# Patient Record
Sex: FEMALE | Race: WHITE | URBAN - METROPOLITAN AREA
[De-identification: names, ages, dates, MRNs, and addresses within clinical notes are randomized per-mention and may not be internally consistent; named-entity substitution may affect disease eponyms.]

---

## 2022-04-10 ENCOUNTER — HISTORICAL (OUTPATIENT)
Dept: ADMINISTRATIVE | Facility: HOSPITAL | Age: 57
End: 2022-04-10

## 2022-04-11 ENCOUNTER — HISTORICAL (OUTPATIENT)
Dept: ADMINISTRATIVE | Facility: HOSPITAL | Age: 57
End: 2022-04-11

## 2022-04-28 VITALS
HEIGHT: 64 IN | WEIGHT: 186.75 LBS | BODY MASS INDEX: 31.88 KG/M2 | OXYGEN SATURATION: 97 % | SYSTOLIC BLOOD PRESSURE: 136 MMHG | DIASTOLIC BLOOD PRESSURE: 78 MMHG

## 2022-04-28 VITALS
SYSTOLIC BLOOD PRESSURE: 136 MMHG | DIASTOLIC BLOOD PRESSURE: 78 MMHG | BODY MASS INDEX: 31.88 KG/M2 | OXYGEN SATURATION: 97 % | WEIGHT: 186.75 LBS | HEIGHT: 64 IN

## 2024-11-06 ENCOUNTER — OFFICE VISIT (OUTPATIENT)
Dept: URGENT CARE | Facility: CLINIC | Age: 59
End: 2024-11-06
Payer: COMMERCIAL

## 2024-11-06 VITALS
HEART RATE: 80 BPM | OXYGEN SATURATION: 99 % | HEIGHT: 64 IN | SYSTOLIC BLOOD PRESSURE: 135 MMHG | BODY MASS INDEX: 31.07 KG/M2 | WEIGHT: 182 LBS | TEMPERATURE: 99 F | RESPIRATION RATE: 16 BRPM | DIASTOLIC BLOOD PRESSURE: 87 MMHG

## 2024-11-06 DIAGNOSIS — R19.7 DIARRHEA OF PRESUMED INFECTIOUS ORIGIN: Primary | ICD-10-CM

## 2024-11-06 PROCEDURE — 87045 FECES CULTURE AEROBIC BACT: CPT | Performed by: PHYSICIAN ASSISTANT

## 2024-11-06 RX ORDER — AMOXICILLIN AND CLAVULANATE POTASSIUM 875; 125 MG/1; MG/1
1 TABLET, FILM COATED ORAL EVERY 12 HOURS
Qty: 20 TABLET | Refills: 0 | Status: SHIPPED | OUTPATIENT
Start: 2024-11-06 | End: 2024-11-16

## 2024-11-06 RX ORDER — FLUOXETINE HYDROCHLORIDE 40 MG/1
40 CAPSULE ORAL EVERY MORNING
COMMUNITY
Start: 2024-10-07

## 2024-11-06 RX ORDER — ONDANSETRON 8 MG/1
8 TABLET, ORALLY DISINTEGRATING ORAL 3 TIMES DAILY PRN
Qty: 6 TABLET | Refills: 0 | Status: SHIPPED | OUTPATIENT
Start: 2024-11-06 | End: 2024-11-08

## 2024-11-06 RX ORDER — ALPRAZOLAM 0.25 MG/1
0.25 TABLET ORAL DAILY PRN
COMMUNITY
Start: 2024-08-01

## 2024-11-06 RX ORDER — BENAZEPRIL HYDROCHLORIDE 10 MG/1
10 TABLET ORAL
COMMUNITY
Start: 2024-10-07

## 2024-11-06 RX ORDER — OMEPRAZOLE 40 MG/1
40 CAPSULE, DELAYED RELEASE ORAL EVERY MORNING
COMMUNITY
Start: 2024-10-07

## 2024-11-06 RX ORDER — ATORVASTATIN CALCIUM 10 MG/1
10 TABLET, FILM COATED ORAL
COMMUNITY
Start: 2024-10-07

## 2024-11-06 NOTE — PROGRESS NOTES
"Subjective:      Patient ID: Ernestina Cortés is a 59 y.o. female.    Vitals:  height is 5' 4" (1.626 m) and weight is 82.6 kg (182 lb). Her tympanic temperature is 98.7 °F (37.1 °C). Her blood pressure is 135/87 and her pulse is 80. Her respiration is 16 and oxygen saturation is 99%.     Chief Complaint: Diarrhea    Female reports attending dinner party with  5 days ago eating chicken and crawfish pie 6 hours later developing acute diarrhea.  Patient states  having day episode acute diarrhea which has since resolved.  Patient reports use of over-the-counter Pepto-Bismol and Imodium for the 1st 2 days then discontinuing for the last 3 days.  Patient reports having periodic vomiting 10+ episodes of nonbloody diarrhea daily.  Patient reports contacting PCP office yesterday encouraged emergency department evaluation presents to urgent care today for initial evaluation.      Diarrhea   This is a new problem. Associated symptoms include vomiting. Pertinent negatives include no abdominal pain, chills or fever.     Constitution: Negative for chills and fever.   HENT: Negative.     Neck: neck negative.   Cardiovascular:  Negative for passing out.   Respiratory: Negative.     Gastrointestinal:  Positive for vomiting and diarrhea. Negative for abdominal pain and bright red blood in stool.   Genitourinary:  Negative for dysuria and pelvic pain.   Musculoskeletal:  Negative for back pain.   Skin: Negative.    Neurological:  Negative for dizziness, light-headedness and passing out.   Psychiatric/Behavioral: Negative.        Objective:     Physical Exam   Constitutional: She is oriented to person, place, and time. She appears well-developed.  Non-toxic appearance.      Comments:Awake alert pleasant smiling ambulatory female     HENT:   Head: Normocephalic.   Mouth/Throat: Mucous membranes are normal.   Eyes: Conjunctivae and lids are normal. No scleral icterus.   Neck: Trachea normal. Neck supple.   Cardiovascular: " "Normal rate, regular rhythm, normal heart sounds and normal pulses.   Pulmonary/Chest: Effort normal. No respiratory distress.   Abdominal: Normal appearance. She exhibits no distension. Soft. There is no abdominal tenderness. There is no rebound and no guarding.      Comments: Soft nontender all quadrants including right lower quadrant and left lower quadrant   Musculoskeletal: Normal range of motion.         General: Normal range of motion.   Neurological: no focal deficit. She is alert and oriented to person, place, and time. She has normal strength.   Skin: Skin is warm, dry, intact, not diaphoretic and not pale. jaundice  Psychiatric: Her speech is normal and behavior is normal. Judgment and thought content normal.   Nursing note and vitals reviewed.         Previous History      Review of patient's allergies indicates:   Allergen Reactions    Guaifenesin     Sulfa (sulfonamide antibiotics)        History reviewed. No pertinent past medical history.  Current Outpatient Medications   Medication Instructions    ALPRAZolam (XANAX) 0.25 mg, Daily PRN    amoxicillin-clavulanate 875-125mg (AUGMENTIN) 875-125 mg per tablet 1 tablet, Oral, Every 12 hours    atorvastatin (LIPITOR) 10 mg    benazepriL (LOTENSIN) 10 mg    FLUoxetine 40 mg, Every morning    omeprazole (PRILOSEC) 40 mg, Every morning    ondansetron (ZOFRAN-ODT) 8 mg, Oral, 3 times daily PRN     History reviewed. No pertinent surgical history.  No family history on file.    Social History     Tobacco Use    Smoking status: Never     Passive exposure: Never    Smokeless tobacco: Never        Physical Exam      Vital Signs Reviewed   /87   Pulse 80   Temp 98.7 °F (37.1 °C) (Tympanic)   Resp 16   Ht 5' 4" (1.626 m)   Wt 82.6 kg (182 lb)   LMP  (Exact Date)   SpO2 99%   BMI 31.24 kg/m²        Procedures    Procedures     Labs   No results found for this or any previous visit.    Assessment:     1. Diarrhea of presumed infectious origin        Plan: "   Discuss with patient concern for persistent diarrhea potential infectious diarrhea given dinner party meal with  having similar symptoms.  Patient understands discharge plan with stool collection kit for stool culture with antibiotic coverage encouraged may resume as needed OTC Imodium which has not been use over the last 3 days.  Patient tolerating oral fluids provided Zofran if needed for nausea or vomiting.  Patient understands discharge plan with encouraged PCP follow-up and strict emergency department precautions if fever or localized abdominal pain develops.    Return stool sample as soon as possible for us to send off for stool culture study.  Recommend Augmentin antibiotic coverage for concern infectious diarrhea suspicion.  Encourage water and noncarbonated fluids over the next 5 days help replenish loss fluids from acute diarrhea.  Zofran if needed for nausea or vomiting.  Recommend levels and temporary hold on blood pressure medication if vomiting diarrhea persist to avoid medication lowering your blood pressure levels or avoid passing out.  Recommend resume Imodium if needed for mild-to-moderate diarrhea.    Primary care physician in 1 week for re-evaluation if not improving.  Recommended emergency department evaluation immediately if nausea vomiting diarrhea persists, fever develops or localized abdominal pain develops.  Diarrhea of presumed infectious origin  -     Stool Culture    Other orders  -     amoxicillin-clavulanate 875-125mg (AUGMENTIN) 875-125 mg per tablet; Take 1 tablet by mouth every 12 (twelve) hours. for 10 days  Dispense: 20 tablet; Refill: 0  -     ondansetron (ZOFRAN-ODT) 8 MG TbDL; Take 1 tablet (8 mg total) by mouth 3 (three) times daily as needed (nausea or vomiting).  Dispense: 6 tablet; Refill: 0

## 2024-11-06 NOTE — PATIENT INSTRUCTIONS
Return stool sample as soon as possible for us to send off for stool culture study.  Recommend Augmentin antibiotic coverage for concern infectious diarrhea suspicion.  Encourage water and noncarbonated fluids over the next 5 days help replenish loss fluids from acute diarrhea.  Zofran if needed for nausea or vomiting.  Recommend levels and temporary hold on blood pressure medication if vomiting diarrhea persist to avoid medication lowering your blood pressure levels or avoid passing out.  Recommend resume Imodium if needed for mild-to-moderate diarrhea.    Primary care physician in 1 week for re-evaluation if not improving.  Recommended emergency department evaluation immediately if nausea vomiting diarrhea persists, fever develops or localized abdominal pain develops.

## 2024-11-09 LAB
BACTERIA STL CULT: ABNORMAL
BACTERIA STL CULT: ABNORMAL